# Patient Record
Sex: MALE | Race: WHITE
[De-identification: names, ages, dates, MRNs, and addresses within clinical notes are randomized per-mention and may not be internally consistent; named-entity substitution may affect disease eponyms.]

---

## 2017-04-23 ENCOUNTER — HOSPITAL ENCOUNTER (OUTPATIENT)
Dept: HOSPITAL 53 - M WUC | Age: 22
End: 2017-04-23
Attending: PHYSICIAN ASSISTANT
Payer: COMMERCIAL
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
## 2017-04-23 DIAGNOSIS — S50.12XA: Primary | ICD-10-CM

## 2017-04-23 DIAGNOSIS — S60.221A: ICD-10-CM

## 2017-10-29 ENCOUNTER — HOSPITAL ENCOUNTER (OUTPATIENT)
Dept: HOSPITAL 53 - M LAB REF | Age: 22
End: 2017-10-29
Attending: PHYSICIAN ASSISTANT
Payer: COMMERCIAL

## 2017-10-29 DIAGNOSIS — R50.9: Primary | ICD-10-CM

## 2017-10-29 DIAGNOSIS — J02.9: ICD-10-CM

## 2022-03-25 NOTE — REP
Left forearm two views :

 

There is no fracture or dislocation.

 

Mineralization and joint spaces are normal.

 

There are no calcifications or foreign bodies.

 

Impression:

 

Negative left forearm .

 

 

Signed by

Mahendra Nieves MD 04/23/2017 02:06 P
Right hand four views :

 

There is no fracture or dislocation.

 

Mineralization and joint spaces are normal.

 

There are no calcifications or foreign bodies.

 

Impression:

 

Negative right hand .

 

 

Signed by

Mahendra Nieves MD 04/23/2017 02:07 P
Alert and oriented, no focal deficits, no motor or sensory deficits.

## 2023-07-03 ENCOUNTER — HOSPITAL ENCOUNTER (OUTPATIENT)
Dept: HOSPITAL 53 - M WUC | Age: 28
End: 2023-07-03
Attending: STUDENT IN AN ORGANIZED HEALTH CARE EDUCATION/TRAINING PROGRAM
Payer: COMMERCIAL

## 2023-07-03 DIAGNOSIS — R30.0: Primary | ICD-10-CM

## 2025-07-17 ENCOUNTER — HOSPITAL ENCOUNTER (OUTPATIENT)
Dept: HOSPITAL 53 - M EKG | Age: 30
End: 2025-07-17
Attending: PHYSICIAN ASSISTANT
Payer: COMMERCIAL

## 2025-07-17 DIAGNOSIS — Z53.9: ICD-10-CM

## 2025-07-17 DIAGNOSIS — R47.01: Primary | ICD-10-CM
